# Patient Record
Sex: MALE | Race: WHITE | NOT HISPANIC OR LATINO | URBAN - METROPOLITAN AREA
[De-identification: names, ages, dates, MRNs, and addresses within clinical notes are randomized per-mention and may not be internally consistent; named-entity substitution may affect disease eponyms.]

---

## 2018-06-12 ENCOUNTER — APPOINTMENT (RX ONLY)
Dept: URBAN - METROPOLITAN AREA OTHER 4 | Facility: OTHER | Age: 48
Setting detail: DERMATOLOGY
End: 2018-06-12

## 2018-06-12 DIAGNOSIS — D22 MELANOCYTIC NEVI: ICD-10-CM

## 2018-06-12 DIAGNOSIS — L81.4 OTHER MELANIN HYPERPIGMENTATION: ICD-10-CM

## 2018-06-12 PROBLEM — D22.5 MELANOCYTIC NEVI OF TRUNK: Status: ACTIVE | Noted: 2018-06-12

## 2018-06-12 PROBLEM — L57.0 ACTINIC KERATOSIS: Status: ACTIVE | Noted: 2018-06-12

## 2018-06-12 PROBLEM — D23.71 OTHER BENIGN NEOPLASM OF SKIN OF RIGHT LOWER LIMB, INCLUDING HIP: Status: ACTIVE | Noted: 2018-06-12

## 2018-06-12 PROCEDURE — ? OBSERVATION

## 2018-06-12 PROCEDURE — ? COUNSELING

## 2018-06-12 PROCEDURE — 99214 OFFICE O/P EST MOD 30 MIN: CPT

## 2018-06-12 ASSESSMENT — LOCATION SIMPLE DESCRIPTION DERM
LOCATION SIMPLE: RIGHT UPPER BACK
LOCATION SIMPLE: CHEST

## 2018-06-12 ASSESSMENT — LOCATION DETAILED DESCRIPTION DERM
LOCATION DETAILED: LEFT LATERAL SUPERIOR CHEST
LOCATION DETAILED: RIGHT SUPERIOR LATERAL UPPER BACK

## 2018-06-12 ASSESSMENT — LOCATION ZONE DERM: LOCATION ZONE: TRUNK

## 2018-06-12 NOTE — HPI: SKIN LESION
How Severe Is Your Skin Lesion?: moderate
Has Your Skin Lesion Been Treated?: not been treated
Is This A New Presentation, Or A Follow-Up?: Skin Lesion
Which Family Member (Optional)?: Father and grandfather

## 2019-06-17 ENCOUNTER — APPOINTMENT (RX ONLY)
Dept: URBAN - METROPOLITAN AREA OTHER 4 | Facility: OTHER | Age: 49
Setting detail: DERMATOLOGY
End: 2019-06-17

## 2019-06-17 DIAGNOSIS — D18.0 HEMANGIOMA: ICD-10-CM

## 2019-06-17 DIAGNOSIS — L81.4 OTHER MELANIN HYPERPIGMENTATION: ICD-10-CM

## 2019-06-17 DIAGNOSIS — L82.1 OTHER SEBORRHEIC KERATOSIS: ICD-10-CM

## 2019-06-17 DIAGNOSIS — D22 MELANOCYTIC NEVI: ICD-10-CM

## 2019-06-17 PROBLEM — D23.71 OTHER BENIGN NEOPLASM OF SKIN OF RIGHT LOWER LIMB, INCLUDING HIP: Status: ACTIVE | Noted: 2019-06-17

## 2019-06-17 PROBLEM — D18.01 HEMANGIOMA OF SKIN AND SUBCUTANEOUS TISSUE: Status: ACTIVE | Noted: 2019-06-17

## 2019-06-17 PROBLEM — D22.5 MELANOCYTIC NEVI OF TRUNK: Status: ACTIVE | Noted: 2019-06-17

## 2019-06-17 PROCEDURE — ? LIQUID NITROGEN (COSMETIC)

## 2019-06-17 PROCEDURE — ? COUNSELING

## 2019-06-17 PROCEDURE — 99214 OFFICE O/P EST MOD 30 MIN: CPT

## 2019-06-17 PROCEDURE — ? OTHER

## 2019-06-17 ASSESSMENT — LOCATION DETAILED DESCRIPTION DERM
LOCATION DETAILED: RIGHT SUPERIOR LATERAL UPPER BACK
LOCATION DETAILED: RIGHT INFERIOR UPPER BACK
LOCATION DETAILED: LEFT MEDIAL SUPERIOR CHEST
LOCATION DETAILED: LEFT SUPERIOR MEDIAL MIDBACK
LOCATION DETAILED: LEFT LATERAL UPPER BACK
LOCATION DETAILED: RIGHT SUPERIOR MEDIAL FOREHEAD

## 2019-06-17 ASSESSMENT — LOCATION ZONE DERM
LOCATION ZONE: TRUNK
LOCATION ZONE: FACE

## 2019-06-17 ASSESSMENT — LOCATION SIMPLE DESCRIPTION DERM
LOCATION SIMPLE: LEFT UPPER BACK
LOCATION SIMPLE: LEFT LOWER BACK
LOCATION SIMPLE: RIGHT FOREHEAD
LOCATION SIMPLE: CHEST
LOCATION SIMPLE: RIGHT UPPER BACK

## 2019-06-17 NOTE — PROCEDURE: OTHER
Detail Level: Simple
Other (Free Text): Plan biopsy if not resolved with LN2
Note Text (......Xxx Chief Complaint.): This diagnosis correlates with the

## 2019-06-17 NOTE — PROCEDURE: LIQUID NITROGEN (COSMETIC)
Render Post-Care Instructions In Note?: no
Post-Care Instructions: I reviewed with the patient in detail post-care instructions. Patient is to wear sunprotection, and avoid picking at any of the treated lesions. Pt may apply Vaseline to crusted or scabbing areas.
Detail Level: Simple
Consent: The patient's consent was obtained including but not limited to risks of crusting, scabbing, blistering, scarring, darker or lighter pigmentary change, recurrence, incomplete removal and infection. The patient understands that the procedure is cosmetic in nature and is not covered by insurance.
Price (Use Numbers Only, No Special Characters Or $): 0

## 2020-07-28 ENCOUNTER — APPOINTMENT (RX ONLY)
Dept: URBAN - METROPOLITAN AREA OTHER 5 | Facility: OTHER | Age: 50
Setting detail: DERMATOLOGY
End: 2020-07-28

## 2020-07-28 DIAGNOSIS — L81.4 OTHER MELANIN HYPERPIGMENTATION: ICD-10-CM

## 2020-07-28 DIAGNOSIS — L21.8 OTHER SEBORRHEIC DERMATITIS: ICD-10-CM

## 2020-07-28 DIAGNOSIS — D22 MELANOCYTIC NEVI: ICD-10-CM

## 2020-07-28 PROBLEM — D23.71 OTHER BENIGN NEOPLASM OF SKIN OF RIGHT LOWER LIMB, INCLUDING HIP: Status: ACTIVE | Noted: 2020-07-28

## 2020-07-28 PROBLEM — D22.5 MELANOCYTIC NEVI OF TRUNK: Status: ACTIVE | Noted: 2020-07-28

## 2020-07-28 PROCEDURE — ? PRESCRIPTION

## 2020-07-28 PROCEDURE — ? COUNSELING

## 2020-07-28 PROCEDURE — ? TREATMENT REGIMEN

## 2020-07-28 PROCEDURE — 99214 OFFICE O/P EST MOD 30 MIN: CPT

## 2020-07-28 RX ORDER — HYDROCORTISONE 25 MG/G
1 CREAM TOPICAL BID
Qty: 1 | Refills: 3 | Status: ERX | COMMUNITY
Start: 2020-07-28

## 2020-07-28 RX ORDER — KETOCONAZOLE 20 MG/ML
1 SHAMPOO, SUSPENSION TOPICAL
Qty: 1 | Refills: 3 | Status: ERX | COMMUNITY
Start: 2020-07-28

## 2020-07-28 RX ORDER — KETOCONAZOLE 20 MG/G
1 CREAM TOPICAL BID
Qty: 1 | Refills: 3 | Status: ERX | COMMUNITY
Start: 2020-07-28

## 2020-07-28 RX ADMIN — KETOCONAZOLE 1: 20 SHAMPOO, SUSPENSION TOPICAL at 00:00

## 2020-07-28 RX ADMIN — KETOCONAZOLE 1: 20 CREAM TOPICAL at 00:00

## 2020-07-28 RX ADMIN — HYDROCORTISONE 1: 25 CREAM TOPICAL at 00:00

## 2020-07-28 ASSESSMENT — LOCATION SIMPLE DESCRIPTION DERM
LOCATION SIMPLE: LEFT UPPER BACK
LOCATION SIMPLE: INFERIOR FOREHEAD
LOCATION SIMPLE: UPPER LIP
LOCATION SIMPLE: ABDOMEN

## 2020-07-28 ASSESSMENT — LOCATION DETAILED DESCRIPTION DERM
LOCATION DETAILED: LEFT INFERIOR MEDIAL UPPER BACK
LOCATION DETAILED: PHILTRUM
LOCATION DETAILED: EPIGASTRIC SKIN
LOCATION DETAILED: LEFT SUPERIOR UPPER BACK
LOCATION DETAILED: INFERIOR MID FOREHEAD

## 2020-07-28 ASSESSMENT — LOCATION ZONE DERM
LOCATION ZONE: LIP
LOCATION ZONE: FACE
LOCATION ZONE: TRUNK

## 2021-07-30 ENCOUNTER — APPOINTMENT (RX ONLY)
Dept: URBAN - METROPOLITAN AREA OTHER 4 | Facility: OTHER | Age: 51
Setting detail: DERMATOLOGY
End: 2021-07-30

## 2021-07-30 DIAGNOSIS — L57.8 OTHER SKIN CHANGES DUE TO CHRONIC EXPOSURE TO NONIONIZING RADIATION: ICD-10-CM

## 2021-07-30 DIAGNOSIS — L21.8 OTHER SEBORRHEIC DERMATITIS: ICD-10-CM | Status: INADEQUATELY CONTROLLED

## 2021-07-30 DIAGNOSIS — Z80.8 FAMILY HISTORY OF MALIGNANT NEOPLASM OF OTHER ORGANS OR SYSTEMS: ICD-10-CM

## 2021-07-30 DIAGNOSIS — D22 MELANOCYTIC NEVI: ICD-10-CM

## 2021-07-30 DIAGNOSIS — Z71.89 OTHER SPECIFIED COUNSELING: ICD-10-CM

## 2021-07-30 PROBLEM — D23.71 OTHER BENIGN NEOPLASM OF SKIN OF RIGHT LOWER LIMB, INCLUDING HIP: Status: ACTIVE | Noted: 2021-07-30

## 2021-07-30 PROBLEM — D22.5 MELANOCYTIC NEVI OF TRUNK: Status: ACTIVE | Noted: 2021-07-30

## 2021-07-30 PROCEDURE — 99213 OFFICE O/P EST LOW 20 MIN: CPT

## 2021-07-30 PROCEDURE — ? PRESCRIPTION

## 2021-07-30 PROCEDURE — ? COUNSELING

## 2021-07-30 PROCEDURE — ? OBSERVATION

## 2021-07-30 RX ORDER — KETOCONAZOLE 20 MG/ML
SHAMPOO, SUSPENSION TOPICAL BIW
Qty: 1 | Refills: 3 | Status: ERX | COMMUNITY
Start: 2021-07-30

## 2021-07-30 RX ORDER — HYDROCORTISONE 25 MG/ML
LOTION TOPICAL BID
Qty: 1 | Refills: 2 | Status: ERX | COMMUNITY
Start: 2021-07-30

## 2021-07-30 RX ORDER — KETOCONAZOLE 20 MG/G
CREAM TOPICAL QD
Qty: 1 | Refills: 2 | Status: ERX | COMMUNITY
Start: 2021-07-30

## 2021-07-30 RX ADMIN — KETOCONAZOLE: 20 CREAM TOPICAL at 00:00

## 2021-07-30 RX ADMIN — KETOCONAZOLE: 20 SHAMPOO, SUSPENSION TOPICAL at 00:00

## 2021-07-30 RX ADMIN — HYDROCORTISONE: 25 LOTION TOPICAL at 00:00

## 2021-07-30 ASSESSMENT — LOCATION DETAILED DESCRIPTION DERM
LOCATION DETAILED: LEFT SUPERIOR MEDIAL BUCCAL CHEEK
LOCATION DETAILED: RIGHT LOWER CUTANEOUS LIP
LOCATION DETAILED: INFERIOR THORACIC SPINE
LOCATION DETAILED: RIGHT CHIN
LOCATION DETAILED: LEFT LATERAL INFERIOR CHEST
LOCATION DETAILED: RIGHT MEDIAL SUPERIOR CHEST
LOCATION DETAILED: POSTERIOR MID-PARIETAL SCALP

## 2021-07-30 ASSESSMENT — LOCATION ZONE DERM
LOCATION ZONE: FACE
LOCATION ZONE: LIP
LOCATION ZONE: SCALP
LOCATION ZONE: TRUNK

## 2021-07-30 ASSESSMENT — LOCATION SIMPLE DESCRIPTION DERM
LOCATION SIMPLE: UPPER BACK
LOCATION SIMPLE: LEFT CHEEK
LOCATION SIMPLE: RIGHT LIP
LOCATION SIMPLE: CHIN
LOCATION SIMPLE: POSTERIOR SCALP
LOCATION SIMPLE: CHEST

## 2021-07-30 ASSESSMENT — SEVERITY ASSESSMENT: HOW SEVERE IS THIS PATIENT'S CONDITION?: MILD

## 2022-09-17 ENCOUNTER — OFFICE VISIT (OUTPATIENT)
Dept: URBAN - METROPOLITAN AREA TELEHEALTH 2 | Facility: TELEHEALTH | Age: 52
End: 2022-09-17
Payer: COMMERCIAL

## 2022-09-17 VITALS — HEIGHT: 69 IN | WEIGHT: 205 LBS | BODY MASS INDEX: 30.36 KG/M2

## 2022-09-17 DIAGNOSIS — Z12.11 COLON CANCER SCREENING: ICD-10-CM

## 2022-09-17 DIAGNOSIS — R11.0 NAUSEA: ICD-10-CM

## 2022-09-17 DIAGNOSIS — Z56.6 STRESS AT WORK: ICD-10-CM

## 2022-09-17 DIAGNOSIS — R15.2 DEFECATION URGENCY: ICD-10-CM

## 2022-09-17 DIAGNOSIS — R19.7 WATERY DIARRHEA: ICD-10-CM

## 2022-09-17 PROCEDURE — 99244 OFF/OP CNSLTJ NEW/EST MOD 40: CPT | Performed by: INTERNAL MEDICINE

## 2022-09-17 PROCEDURE — 99204 OFFICE O/P NEW MOD 45 MIN: CPT | Performed by: INTERNAL MEDICINE

## 2022-09-17 RX ORDER — NORTRIPTYLINE HYDROCHLORIDE 10 MG/1
1 CAPSULE CAPSULE ORAL
Qty: 30 | Refills: 2 | OUTPATIENT

## 2022-09-17 RX ORDER — PANTOPRAZOLE SODIUM 40 MG/1
1 TABLET TABLET, DELAYED RELEASE ORAL ONCE A DAY
Qty: 30 | Refills: 2 | OUTPATIENT

## 2022-09-17 RX ORDER — SODIUM, POTASSIUM,MAG SULFATES 17.5-3.13G
177 ML SOLUTION, RECONSTITUTED, ORAL ORAL AS DIRECTED
Qty: 1 BOX | Refills: 0 | OUTPATIENT
Start: 2022-09-17 | End: 2022-09-18

## 2022-09-17 SDOH — HEALTH STABILITY - MENTAL HEALTH: OTHER PHYSICAL AND MENTAL STRAIN RELATED TO WORK: Z56.6

## 2022-09-17 NOTE — HPI-TODAY'S VISIT:
The patient was referred by Dr. Leticia Montero for bowel issues.   A copy of this document is being forwarded to the referring provider.  52 y.o. 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

## 2022-09-22 PROBLEM — 71820002: Status: ACTIVE | Noted: 2022-09-22

## 2022-09-22 PROBLEM — 305058001: Status: ACTIVE | Noted: 2022-09-22

## 2022-10-20 ENCOUNTER — TELEPHONE ENCOUNTER (OUTPATIENT)
Dept: URBAN - METROPOLITAN AREA CLINIC 23 | Facility: CLINIC | Age: 52
End: 2022-10-20

## 2022-10-20 RX ORDER — SODIUM, POTASSIUM,MAG SULFATES 17.5-3.13G
177 ML SOLUTION, RECONSTITUTED, ORAL ORAL AS DIRECTED
Qty: 1 BOX | Refills: 0 | OUTPATIENT
Start: 2022-10-20 | End: 2022-10-21

## 2022-10-20 RX ORDER — NORTRIPTYLINE HYDROCHLORIDE 10 MG/1
1 CAPSULE CAPSULE ORAL
Qty: 30 | Refills: 2 | Status: ACTIVE | COMMUNITY

## 2022-10-20 RX ORDER — PANTOPRAZOLE SODIUM 40 MG/1
1 TABLET TABLET, DELAYED RELEASE ORAL ONCE A DAY
Qty: 30 | Refills: 2 | Status: ACTIVE | COMMUNITY

## 2022-10-24 ENCOUNTER — CLAIMS CREATED FROM THE CLAIM WINDOW (OUTPATIENT)
Dept: URBAN - METROPOLITAN AREA CLINIC 4 | Facility: CLINIC | Age: 52
End: 2022-10-24
Payer: COMMERCIAL

## 2022-10-24 ENCOUNTER — WEB ENCOUNTER (OUTPATIENT)
Dept: URBAN - METROPOLITAN AREA CLINIC 92 | Facility: CLINIC | Age: 52
End: 2022-10-24

## 2022-10-24 ENCOUNTER — OFFICE VISIT (OUTPATIENT)
Dept: URBAN - METROPOLITAN AREA SURGERY CENTER 18 | Facility: SURGERY CENTER | Age: 52
End: 2022-10-24
Payer: COMMERCIAL

## 2022-10-24 DIAGNOSIS — K31.89 ACQUIRED DEFORMITY OF DUODENUM: ICD-10-CM

## 2022-10-24 DIAGNOSIS — D12.3 ADENOMA OF TRANSVERSE COLON: ICD-10-CM

## 2022-10-24 DIAGNOSIS — D12.0 ADENOMA OF CECUM: ICD-10-CM

## 2022-10-24 DIAGNOSIS — R19.7 ACUTE DIARRHEA: ICD-10-CM

## 2022-10-24 DIAGNOSIS — K29.70 GASTRITIS, UNSPECIFIED, WITHOUT BLEEDING: ICD-10-CM

## 2022-10-24 DIAGNOSIS — D12.4 ADENOMA OF DESCENDING COLON: ICD-10-CM

## 2022-10-24 DIAGNOSIS — K21.9 ACID REFLUX: ICD-10-CM

## 2022-10-24 DIAGNOSIS — K31.89 OTHER DISEASES OF STOMACH AND DUODENUM: ICD-10-CM

## 2022-10-24 PROCEDURE — 45380 COLONOSCOPY AND BIOPSY: CPT | Performed by: INTERNAL MEDICINE

## 2022-10-24 PROCEDURE — 88305 TISSUE EXAM BY PATHOLOGIST: CPT | Performed by: PATHOLOGY

## 2022-10-24 PROCEDURE — 45385 COLONOSCOPY W/LESION REMOVAL: CPT | Performed by: INTERNAL MEDICINE

## 2022-10-24 PROCEDURE — 43239 EGD BIOPSY SINGLE/MULTIPLE: CPT | Performed by: INTERNAL MEDICINE

## 2022-10-24 PROCEDURE — 88312 SPECIAL STAINS GROUP 1: CPT | Performed by: PATHOLOGY

## 2022-10-24 PROCEDURE — G8907 PT DOC NO EVENTS ON DISCHARG: HCPCS | Performed by: INTERNAL MEDICINE

## 2022-10-24 RX ORDER — PANTOPRAZOLE SODIUM 40 MG/1
1 TABLET TABLET, DELAYED RELEASE ORAL TWICE A DAY
Qty: 180 TABLET | Refills: 0 | OUTPATIENT

## 2022-10-24 RX ORDER — PANTOPRAZOLE SODIUM 40 MG/1
1 TABLET TABLET, DELAYED RELEASE ORAL ONCE A DAY
Qty: 30 | Refills: 2 | Status: ACTIVE | COMMUNITY

## 2022-10-24 RX ORDER — NORTRIPTYLINE HYDROCHLORIDE 10 MG/1
1 CAPSULE CAPSULE ORAL
Qty: 30 | Refills: 2 | Status: ACTIVE | COMMUNITY

## 2022-11-17 ENCOUNTER — CLAIMS CREATED FROM THE CLAIM WINDOW (OUTPATIENT)
Dept: URBAN - METROPOLITAN AREA TELEHEALTH 2 | Facility: TELEHEALTH | Age: 52
End: 2022-11-17
Payer: COMMERCIAL

## 2022-11-17 VITALS — HEIGHT: 69 IN

## 2022-11-17 DIAGNOSIS — K22.10 EROSIVE ESOPHAGITIS: ICD-10-CM

## 2022-11-17 DIAGNOSIS — R19.7 WATERY DIARRHEA: ICD-10-CM

## 2022-11-17 DIAGNOSIS — R11.0 NAUSEA: ICD-10-CM

## 2022-11-17 PROBLEM — 40719004: Status: ACTIVE | Noted: 2022-11-17

## 2022-11-17 PROBLEM — 422587007: Status: ACTIVE | Noted: 2022-09-22

## 2022-11-17 PROBLEM — 62315008: Status: ACTIVE | Noted: 2022-09-22

## 2022-11-17 PROCEDURE — 99213 OFFICE O/P EST LOW 20 MIN: CPT | Performed by: INTERNAL MEDICINE

## 2022-11-17 RX ORDER — PANTOPRAZOLE SODIUM 40 MG/1
1 TABLET TABLET, DELAYED RELEASE ORAL TWICE A DAY
Qty: 180 TABLET | Refills: 0 | Status: ACTIVE | COMMUNITY

## 2022-11-17 RX ORDER — NORTRIPTYLINE HYDROCHLORIDE 10 MG/1
1 CAPSULE CAPSULE ORAL
Qty: 30 | Refills: 2 | Status: ACTIVE | COMMUNITY

## 2022-11-17 NOTE — HPI-TODAY'S VISIT:
Patient is a 52-year-old male who presents to follow-up from recent upper endoscopy and colonoscopy completed on 10/24/2022.   Findings of EGD  included a small hiatal hernia.  Moderately severe erosive esophagitis with no bleeding.  Bilious gastric fluid with no gross lesions in the entire stomach.  Normal-appearing duodenum.  After procedure advised to use Protonix 40 mg twice a day.   Colonoscopy revealed 1, 6 mm polyp in the cecum.  3, 8 to 16 mm polyps in the transverse colon.  2, 11 to 13 mm polyps in the descending colon.   Pathology report revealed no significant abnormality of the duodenum.  Reactive gastropathy in the stomach.  Reflux type changes in the distal esophagus with no Nuno's esophagus or eosinophilic esophagitis.   Tubular adenomas found in the colon.  Was recommended repeat colonoscopy in 2 years. Currently on Pantoprazole 40mg BID. He states this has significantly improved symptoms  Denies vomiting episodes Notices that smells can trigger nausea but does feel it has improoved since PPI dosing  He has cut out lemon, caffeine, and spicy foods  He knows MSG is a trigger to change in bowel habits  This is also helping in stools- Currently stools are more formed Deneis fever or chills Denies unintetnional weight loss

## 2022-12-08 ENCOUNTER — APPOINTMENT (RX ONLY)
Dept: URBAN - METROPOLITAN AREA OTHER 4 | Facility: OTHER | Age: 52
Setting detail: DERMATOLOGY
End: 2022-12-08

## 2022-12-08 DIAGNOSIS — L24 IRRITANT CONTACT DERMATITIS: ICD-10-CM

## 2022-12-08 DIAGNOSIS — L57.8 OTHER SKIN CHANGES DUE TO CHRONIC EXPOSURE TO NONIONIZING RADIATION: ICD-10-CM

## 2022-12-08 DIAGNOSIS — L21.8 OTHER SEBORRHEIC DERMATITIS: ICD-10-CM | Status: INADEQUATELY CONTROLLED

## 2022-12-08 DIAGNOSIS — D22 MELANOCYTIC NEVI: ICD-10-CM

## 2022-12-08 PROBLEM — D22.5 MELANOCYTIC NEVI OF TRUNK: Status: ACTIVE | Noted: 2022-12-08

## 2022-12-08 PROBLEM — L24.9 IRRITANT CONTACT DERMATITIS, UNSPECIFIED CAUSE: Status: ACTIVE | Noted: 2022-12-08

## 2022-12-08 PROBLEM — L30.9 DERMATITIS, UNSPECIFIED: Status: ACTIVE | Noted: 2022-12-08

## 2022-12-08 PROCEDURE — ? COUNSELING

## 2022-12-08 PROCEDURE — ? TREATMENT REGIMEN

## 2022-12-08 PROCEDURE — ? PRESCRIPTION MEDICATION MANAGEMENT

## 2022-12-08 PROCEDURE — ? OTHER

## 2022-12-08 PROCEDURE — ? PRESCRIPTION

## 2022-12-08 PROCEDURE — ? OBSERVATION

## 2022-12-08 PROCEDURE — 99213 OFFICE O/P EST LOW 20 MIN: CPT

## 2022-12-08 RX ORDER — KETOCONAZOLE 20 MG/G
CREAM TOPICAL BID
Qty: 30 | Refills: 3 | Status: ERX

## 2022-12-08 RX ORDER — KETOCONAZOLE 20 MG/ML
SHAMPOO, SUSPENSION TOPICAL BID
Qty: 120 | Refills: 5 | Status: ERX

## 2022-12-08 RX ORDER — CICLOPIROX 7.7 MG/G
GEL TOPICAL
Qty: 30 | Refills: 3 | Status: ERX | COMMUNITY
Start: 2022-12-08

## 2022-12-08 RX ORDER — HYDROCORTISONE 25 MG/ML
LOTION TOPICAL
Qty: 59 | Refills: 2 | Status: ERX

## 2022-12-08 RX ORDER — TRIAMCINOLONE ACETONIDE 1 MG/G
CREAM TOPICAL BID
Qty: 453.6 | Refills: 2 | Status: ERX | COMMUNITY
Start: 2022-12-08

## 2022-12-08 RX ADMIN — TRIAMCINOLONE ACETONIDE: 1 CREAM TOPICAL at 00:00

## 2022-12-08 RX ADMIN — CICLOPIROX: 7.7 GEL TOPICAL at 00:00

## 2022-12-08 ASSESSMENT — PAIN INTENSITY VAS: HOW INTENSE IS YOUR PAIN 0 BEING NO PAIN, 10 BEING THE MOST SEVERE PAIN POSSIBLE?: NO PAIN

## 2022-12-08 ASSESSMENT — LOCATION SIMPLE DESCRIPTION DERM
LOCATION SIMPLE: LEFT CHEEK
LOCATION SIMPLE: RIGHT THIGH
LOCATION SIMPLE: CHEST
LOCATION SIMPLE: RIGHT LIP
LOCATION SIMPLE: CHIN
LOCATION SIMPLE: POSTERIOR SCALP
LOCATION SIMPLE: LEFT THIGH
LOCATION SIMPLE: LEFT UPPER BACK

## 2022-12-08 ASSESSMENT — LOCATION DETAILED DESCRIPTION DERM
LOCATION DETAILED: LEFT MEDIAL UPPER BACK
LOCATION DETAILED: RIGHT ANTERIOR PROXIMAL THIGH
LOCATION DETAILED: RIGHT CHIN
LOCATION DETAILED: LEFT ANTERIOR PROXIMAL THIGH
LOCATION DETAILED: POSTERIOR MID-PARIETAL SCALP
LOCATION DETAILED: LEFT LATERAL INFERIOR CHEST
LOCATION DETAILED: LEFT SUPERIOR MEDIAL BUCCAL CHEEK
LOCATION DETAILED: RIGHT UPPER CUTANEOUS LIP

## 2022-12-08 ASSESSMENT — LOCATION ZONE DERM
LOCATION ZONE: FACE
LOCATION ZONE: TRUNK
LOCATION ZONE: LEG
LOCATION ZONE: SCALP
LOCATION ZONE: LIP

## 2022-12-08 ASSESSMENT — SEVERITY ASSESSMENT 2020: SEVERITY 2020: MILD

## 2022-12-08 ASSESSMENT — SEVERITY ASSESSMENT: HOW SEVERE IS THIS PATIENT'S CONDITION?: MODERATE

## 2022-12-08 NOTE — PROCEDURE: PRESCRIPTION MEDICATION MANAGEMENT
Detail Level: Simple
Render In Strict Bullet Format?: No
Plan: If QD Ketoconazole not effective,  can replace with ciclopirox
Initiate Treatment: Scalp: \\nKetoconazole shampoo \\nFace: \\nKeroconazole cr QD \\nHydrocortisone lot prn flare \\nCiclopirox gel QD

## 2023-01-11 ENCOUNTER — TELEPHONE ENCOUNTER (OUTPATIENT)
Dept: URBAN - METROPOLITAN AREA CLINIC 96 | Facility: CLINIC | Age: 53
End: 2023-01-11

## 2023-01-11 RX ORDER — PANTOPRAZOLE SODIUM 40 MG/1
1 TABLET TABLET, DELAYED RELEASE ORAL TWICE A DAY
Qty: 180 TABLET | Refills: 2

## 2023-01-23 ENCOUNTER — OFFICE VISIT (OUTPATIENT)
Dept: URBAN - METROPOLITAN AREA CLINIC 96 | Facility: CLINIC | Age: 53
End: 2023-01-23

## 2023-03-08 ENCOUNTER — OFFICE VISIT (OUTPATIENT)
Dept: URBAN - METROPOLITAN AREA TELEHEALTH 2 | Facility: TELEHEALTH | Age: 53
End: 2023-03-08

## 2023-03-08 RX ORDER — NORTRIPTYLINE HYDROCHLORIDE 10 MG/1
1 CAPSULE CAPSULE ORAL
Qty: 30 | Refills: 2 | Status: ACTIVE | COMMUNITY

## 2023-03-08 RX ORDER — PANTOPRAZOLE SODIUM 40 MG/1
1 TABLET TABLET, DELAYED RELEASE ORAL TWICE A DAY
Qty: 180 TABLET | Refills: 2 | Status: ACTIVE | COMMUNITY

## 2023-03-30 ENCOUNTER — OFFICE VISIT (OUTPATIENT)
Dept: URBAN - METROPOLITAN AREA TELEHEALTH 2 | Facility: TELEHEALTH | Age: 53
End: 2023-03-30
Payer: COMMERCIAL

## 2023-03-30 ENCOUNTER — DASHBOARD ENCOUNTERS (OUTPATIENT)
Age: 53
End: 2023-03-30

## 2023-03-30 DIAGNOSIS — K21.00 GASTROESOPHAGEAL REFLUX DISEASE WITH ESOPHAGITIS WITHOUT HEMORRHAGE: ICD-10-CM

## 2023-03-30 DIAGNOSIS — R11.0 NAUSEA: ICD-10-CM

## 2023-03-30 DIAGNOSIS — R63.4 WEIGHT LOSS: ICD-10-CM

## 2023-03-30 DIAGNOSIS — R15.2 FECAL URGENCY: ICD-10-CM

## 2023-03-30 PROBLEM — 266433003: Status: ACTIVE | Noted: 2023-03-30

## 2023-03-30 PROCEDURE — 99213 OFFICE O/P EST LOW 20 MIN: CPT | Performed by: INTERNAL MEDICINE

## 2023-03-30 RX ORDER — PANTOPRAZOLE SODIUM 20 MG/1
1 TABLET TABLET, DELAYED RELEASE ORAL ONCE A DAY
Qty: 90 TABLET | Refills: 1 | OUTPATIENT
Start: 2023-03-30

## 2023-03-30 RX ORDER — RIFAXIMIN 550 MG/1
1 TABLET TABLET ORAL THREE TIMES A DAY
Qty: 42 TABLET | Refills: 0 | OUTPATIENT
Start: 2023-03-30 | End: 2023-04-09

## 2023-03-30 NOTE — HPI-TODAY'S VISIT:
53 y.o. WM Symptoms persistent Want to discuss triggers Nausea and vomiting if cutting raw chicken or pork - triggers it immediately - infrequently, not every time If eats Asian food (Chinese), stomach will immediately want to let it go Was cooking fried rice - put in soy sauce - smell made him throw up Tends to eat very little now - losing weight which is good - very bland Entire set of triggers haven't been ameliorated The other is travels a lot - if goes on long haul flight, will feel quite a bit of discomfort w/ nausea Tried reducing spicy foods, alcohol, dairy, onions - syx still come back When brushes teeth, in throat by matthew mendez, makes him want to gag Urgency / travels diarrhea that bothers him the most

## 2023-04-01 ENCOUNTER — P2P PATIENT RECORD (OUTPATIENT)
Age: 53
End: 2023-04-01

## 2024-05-03 ENCOUNTER — OFFICE VISIT (OUTPATIENT)
Dept: URBAN - METROPOLITAN AREA CLINIC 96 | Facility: CLINIC | Age: 54
End: 2024-05-03

## 2024-05-03 RX ORDER — PANTOPRAZOLE SODIUM 20 MG/1
1 TABLET TABLET, DELAYED RELEASE ORAL ONCE A DAY
Qty: 30 | Refills: 0 | Status: ACTIVE | COMMUNITY
Start: 2023-03-30

## 2024-05-03 RX ORDER — PANTOPRAZOLE SODIUM 20 MG/1
1 TABLET TABLET, DELAYED RELEASE ORAL ONCE A DAY
Qty: 90 TABLET | Refills: 1 | OUTPATIENT

## 2024-05-17 ENCOUNTER — OFFICE VISIT (OUTPATIENT)
Dept: URBAN - METROPOLITAN AREA CLINIC 96 | Facility: CLINIC | Age: 54
End: 2024-05-17

## 2024-06-03 ENCOUNTER — OFFICE VISIT (OUTPATIENT)
Dept: URBAN - METROPOLITAN AREA CLINIC 96 | Facility: CLINIC | Age: 54
End: 2024-06-03
Payer: COMMERCIAL

## 2024-06-03 VITALS
WEIGHT: 200.4 LBS | HEART RATE: 74 BPM | HEIGHT: 69 IN | TEMPERATURE: 97.9 F | DIASTOLIC BLOOD PRESSURE: 71 MMHG | BODY MASS INDEX: 29.68 KG/M2 | SYSTOLIC BLOOD PRESSURE: 107 MMHG

## 2024-06-03 DIAGNOSIS — R15.2 FECAL URGENCY: ICD-10-CM

## 2024-06-03 DIAGNOSIS — R11.0 NAUSEA: ICD-10-CM

## 2024-06-03 DIAGNOSIS — R63.4 WEIGHT LOSS: ICD-10-CM

## 2024-06-03 DIAGNOSIS — K21.00 GASTROESOPHAGEAL REFLUX DISEASE WITH ESOPHAGITIS WITHOUT HEMORRHAGE: ICD-10-CM

## 2024-06-03 PROCEDURE — 99214 OFFICE O/P EST MOD 30 MIN: CPT

## 2024-06-03 RX ORDER — PANTOPRAZOLE SODIUM 20 MG/1
1 TABLET TABLET, DELAYED RELEASE ORAL ONCE A DAY
Qty: 90 TABLET | Refills: 1 | Status: ACTIVE | COMMUNITY

## 2024-06-03 NOTE — HPI-OTHER HISTORIES
Previously 3/2023 with Dr. Martinez: 53 y.o. WM Symptoms persistent Want to discuss triggers Nausea and vomiting if cutting raw chicken or pork - triggers it immediately - infrequently, not every time If eats Asian food (Chinese), stomach will immediately want to let it go Was cooking fried rice - put in soy sauce - smell made him throw up Tends to eat very little now - losing weight which is good - very bland Entire set of triggers haven't been ameliorated The other is travels a lot - if goes on long haul flight, will feel quite a bit of discomfort w/ nausea Tried reducing spicy foods, alcohol, dairy, onions - syx still come back When brushes teeth, in throat by castro apple, makes him want to gag Urgency / travels diarrhea that bothers him the most

## 2024-06-03 NOTE — HPI-TODAY'S VISIT:
54 year old male presents for follow-up. Still same symptoms as before.  Did not have labwork completed, [place]. Violently ill - diarrhea - espcially after dairy. If nausea present while cooking -- especially meats -- diarrhea and vomiting Authenic Chinese immediate diarrhea Don't tend to get it overseas - travels a lot. No PCP in years.   Follows with cardiology - recent work-up.  Gets results tomorrow.

## 2024-07-10 ENCOUNTER — LAB OUTSIDE AN ENCOUNTER (OUTPATIENT)
Dept: URBAN - METROPOLITAN AREA CLINIC 96 | Facility: CLINIC | Age: 54
End: 2024-07-10

## 2024-07-11 ENCOUNTER — LAB OUTSIDE AN ENCOUNTER (OUTPATIENT)
Dept: URBAN - METROPOLITAN AREA CLINIC 96 | Facility: CLINIC | Age: 54
End: 2024-07-11

## 2024-07-11 ENCOUNTER — TELEPHONE ENCOUNTER (OUTPATIENT)
Dept: URBAN - METROPOLITAN AREA CLINIC 96 | Facility: CLINIC | Age: 54
End: 2024-07-11

## 2024-07-11 LAB
ALBUMIN: 4.6
ALKALINE PHOSPHATASE: 152
ALT (SGPT): 129
AST (SGOT): 123
BASO (ABSOLUTE): 0.1
BASOS: 1
BILIRUBIN, TOTAL: 0.5
BUN/CREATININE RATIO: 18
BUN: 24
CALCIUM: 9.9
CARBON DIOXIDE, TOTAL: 14
CHLORIDE: 104
CREATININE: 1.35
EGFR: 63
EOS (ABSOLUTE): 0.2
EOS: 2
GLOBULIN, TOTAL: 2.7
GLUCOSE: 74
HEMATOCRIT: 37.7
HEMATOLOGY COMMENTS:: (no result)
HEMOGLOBIN: 12.5
IMMATURE CELLS: (no result)
IMMATURE GRANS (ABS): 0.5
IMMATURE GRANULOCYTES: 5
LYMPHS (ABSOLUTE): 2.6
LYMPHS: 26
MCH: 36.4
MCHC: 33.2
MCV: 110
MONOCYTES(ABSOLUTE): 0.9
MONOCYTES: 9
NEUTROPHILS (ABSOLUTE): 5.9
NEUTROPHILS: 57
NRBC: (no result)
PLATELETS: 186
POTASSIUM: 4.3
PROTEIN, TOTAL: 7.3
RBC: 3.43
RDW: 14.1
SODIUM: 138
T4,FREE(DIRECT): 1.52
TSH: 3.29
WBC: 10.2

## 2024-07-17 ENCOUNTER — TELEPHONE ENCOUNTER (OUTPATIENT)
Dept: URBAN - METROPOLITAN AREA CLINIC 96 | Facility: CLINIC | Age: 54
End: 2024-07-17

## 2025-06-09 ENCOUNTER — OFFICE VISIT (OUTPATIENT)
Dept: URBAN - METROPOLITAN AREA CLINIC 96 | Facility: CLINIC | Age: 55
End: 2025-06-09

## 2025-06-16 ENCOUNTER — OFFICE VISIT (OUTPATIENT)
Dept: URBAN - METROPOLITAN AREA CLINIC 96 | Facility: CLINIC | Age: 55
End: 2025-06-16

## 2025-06-20 ENCOUNTER — LAB OUTSIDE AN ENCOUNTER (OUTPATIENT)
Dept: URBAN - METROPOLITAN AREA CLINIC 96 | Facility: CLINIC | Age: 55
End: 2025-06-20

## 2025-06-20 ENCOUNTER — OFFICE VISIT (OUTPATIENT)
Dept: URBAN - METROPOLITAN AREA CLINIC 96 | Facility: CLINIC | Age: 55
End: 2025-06-20
Payer: COMMERCIAL

## 2025-06-20 DIAGNOSIS — K76.0 HEPATIC STEATOSIS: ICD-10-CM

## 2025-06-20 DIAGNOSIS — R11.2 NAUSEA AND VOMITING IN ADULT: ICD-10-CM

## 2025-06-20 DIAGNOSIS — Z86.0101 PERSONAL HISTORY OF ADENOMATOUS AND SERRATED COLON POLYPS: ICD-10-CM

## 2025-06-20 DIAGNOSIS — K21.00 GASTROESOPHAGEAL REFLUX DISEASE WITH ESOPHAGITIS WITHOUT HEMORRHAGE: ICD-10-CM

## 2025-06-20 DIAGNOSIS — R15.2 FECAL URGENCY: ICD-10-CM

## 2025-06-20 DIAGNOSIS — D53.9 MACROCYTIC ANEMIA: ICD-10-CM

## 2025-06-20 DIAGNOSIS — R74.8 ABNORMAL LIVER ENZYMES: ICD-10-CM

## 2025-06-20 DIAGNOSIS — R63.4 WEIGHT LOSS: ICD-10-CM

## 2025-06-20 PROBLEM — 83414005: Status: ACTIVE | Noted: 2025-06-20

## 2025-06-20 PROCEDURE — 99215 OFFICE O/P EST HI 40 MIN: CPT

## 2025-06-20 RX ORDER — FOLIC ACID 1 MG/1
TAKE ONE TABLET BY MOUTH ONCE DAILY TABLET ORAL
Qty: 90 EACH | Refills: 1 | Status: ACTIVE | COMMUNITY

## 2025-06-20 RX ORDER — PANTOPRAZOLE SODIUM 20 MG/1
1 TABLET TABLET, DELAYED RELEASE ORAL ONCE A DAY
Qty: 90 TABLET | Refills: 1 | Status: ACTIVE | COMMUNITY

## 2025-06-20 RX ORDER — BUPROPION HYDROCHLORIDE 200 MG/1
TABLET, EXTENDED RELEASE ORAL
Qty: 90 TABLET | Status: ACTIVE | COMMUNITY

## 2025-06-20 RX ORDER — ATORVASTATIN CALCIUM 20 MG/1
1 TAB(S) ORALLY ONCE A DAY 90 DAYS TABLET, FILM COATED ORAL
Qty: 90 EACH | Refills: 1 | Status: ACTIVE | COMMUNITY

## 2025-06-20 RX ORDER — ALLOPURINOL 100 MG/1
TAKE ONE TABLET BY MOUTH TWICE DAILY TABLET ORAL
Qty: 60 EACH | Refills: 1 | Status: ACTIVE | COMMUNITY

## 2025-06-20 NOTE — HPI-TODAY'S VISIT:
Patient was admitted to [facility] 1/23 - 1/20/2025 for acute on chronic nausea, vomiting, and diarrhea, JOE, hypokalemia, hypomagnesemia, alcohol hepatitis, transaminitis, macrocytic anemia, hypertension.  Right upper quadrant ultrasound revealed hepatic steatosis, otherwise unremarkable.  Hepatitis panel was negative.  CT revealed mild wall thickening of rectosigmoid junction which was nonspecific. GI was not consulted during the admission - due to symptom improvement after one day. . Review of primary care note from 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  He has a long history of severe anxiety, currently seeing a psychiatrist.  With the addition of low-dose Wellbutrin he is better.  He is now able to eat without any nausea.  He is currently off his PPI.  While hospitalized he stopped his BP med and allopurinol." . Due to abnormal liver enzymes, he was advised to discontinue his statin.  Primary care labs in February revealed leukocytosis, 15.8, macrocytosis with , and continued elevated hepatic function panel: T. bili 0.8, alk phos 174 (H), AST 62 (H), ALT 65 (H).  TSH, lipid panel, celiac panel unremarkable . Today on 6/20/2025, patient informed me of his hospitalization and efforts to take better care of himself.  He is still drinking alcohol - but has cut back significantly.  His prior issues from last year have significantly improved.  Nausea triggers - such as meats when he is cooking - do not bother him currently. Still with a sensitive gag-reflex -- points to his neck slightly inferior to the mandibular angle.  Often happens when brushing his teeth.   . Labs, 7/10/2024: T. bili 0.5, alk phos 152 (H),  (H),  (H), hemoglobin 12.5 (L),  (H), platelets 186. . Colonoscopy 10/2022 revealed 6 mm sessile polyp in cecum, 3 sessile polyps ranging 8 to 16 mm in size in transverse colon, 2 semipedunculated polyps ranging 11 to 13 mm in size in descending colon.  Polyp biopsies revealed tubular adenomas, while random colonic biopsy without evidence of microscopic colitis.  Repeat 2 years.  EGD 10/2022 revealed small hiatal hernia, moderately severe erosive esophagitis, bilious fluid in gastric body.  Esophageal biopsy with reflux type changes but without evidence of BE or EOE.  Stomach biopsies with chemical/reactive gastropathy, and duodenal biopsies unremarkable.

## 2025-06-20 NOTE — HPI-OTHER HISTORIES
Previously 6/2024 with MEG Benedict: 54 year old male presents for follow-up. Still same symptoms as before.  Did not have labwork completed, since [place] was [place]. Violently ill - diarrhea - espcially after dairy. If nausea present while cooking -- especially meats -- diarrhea and vomiting Authenic Chinese immediate diarrhea Don't tend to get it overseas - travels a lot. No PCP in years.  Follows with cardiology - recent work-up.  Gets results tomorrow.  Previously 3/2023 with Dr. Martinez: 53 y.o. WM Symptoms persistent Want to discuss triggers Nausea and vomiting if cutting raw chicken or pork - triggers it immediately - infrequently, not every time If eats Asian food (Chinese), stomach will immediately want to let it go Was cooking fried rice - put in soy sauce - smell made him throw up Tends to eat very little now - losing weight which is good - very bland Entire set of triggers haven't been ameliorated The other is travels a lot - if goes on long haul flight, will feel quite a bit of discomfort w/ nausea Tried reducing spicy foods, alcohol, dairy, onions - syx still come back When brushes teeth, in throat by matthew mendez, makes him want to gag Urgency / travels diarrhea that bothers him the most

## 2025-06-22 PROBLEM — 197321007: Status: ACTIVE | Noted: 2025-06-22

## 2025-07-08 ENCOUNTER — LAB OUTSIDE AN ENCOUNTER (OUTPATIENT)
Dept: URBAN - METROPOLITAN AREA CLINIC 96 | Facility: CLINIC | Age: 55
End: 2025-07-08

## 2025-07-11 LAB
A/G RATIO: 1.8
ACTIN (SMOOTH MUSCLE) ANTIBODY (IGG): <20
ALBUMIN: 4.3
ALKALINE PHOSPHATASE: 200
ALPHA-1-ANTITRYPSIN QN: 165
ALT (SGPT): 77
AST (SGOT): 97
BILIRUBIN, TOTAL: 1.5
BUN/CREATININE RATIO: (no result)
BUN: 12
CALCIUM: 9.3
CARBON DIOXIDE, TOTAL: 24
CERULOPLASMIN: 24
CHLORIDE: 104
CREATININE: 1.27
EGFR: 68
FERRITIN, SERUM: 404
FOLATE, SERUM: 18.1
GLOBULIN, TOTAL: 2.4
GLUCOSE: 94
HEMATOCRIT: 43.4
HEMOGLOBIN: 14.1
IRON BIND.CAP.(TIBC): 394
IRON SATURATION: 36
IRON: 142
LIPASE: 26
MCH: 34.6
MCHC: 32.5
MCV: 106.4
MITOCHONDRIAL (M2) ANTIBODY: <=20
MPV: 13.4
PLATELET COUNT: 33
POTASSIUM: 3.8
PROTEIN, TOTAL: 6.7
RDW: 13.7
RED BLOOD CELL COUNT: 4.08
SODIUM: 140
VITAMIN B12: 1211
WHITE BLOOD CELL COUNT: 8.4

## 2025-07-15 ENCOUNTER — OFFICE VISIT (OUTPATIENT)
Dept: URBAN - METROPOLITAN AREA SURGERY CENTER 18 | Facility: SURGERY CENTER | Age: 55
End: 2025-07-15

## 2025-07-15 ENCOUNTER — WEB ENCOUNTER (OUTPATIENT)
Dept: URBAN - METROPOLITAN AREA CLINIC 96 | Facility: CLINIC | Age: 55
End: 2025-07-15

## 2025-07-15 PROBLEM — 302215000: Status: ACTIVE | Noted: 2025-07-15

## 2025-07-15 RX ORDER — PANTOPRAZOLE SODIUM 40 MG/1
1 TABLET TABLET, DELAYED RELEASE ORAL TWICE A DAY
Qty: 180 TABLET | Refills: 0 | OUTPATIENT
Start: 2025-07-15

## 2025-07-21 ENCOUNTER — TELEPHONE ENCOUNTER (OUTPATIENT)
Dept: URBAN - METROPOLITAN AREA CLINIC 96 | Facility: CLINIC | Age: 55
End: 2025-07-21

## 2025-07-30 ENCOUNTER — TELEPHONE ENCOUNTER (OUTPATIENT)
Dept: URBAN - METROPOLITAN AREA CLINIC 96 | Facility: CLINIC | Age: 55
End: 2025-07-30

## 2025-08-01 ENCOUNTER — WEB ENCOUNTER (OUTPATIENT)
Dept: URBAN - METROPOLITAN AREA CLINIC 96 | Facility: CLINIC | Age: 55
End: 2025-08-01

## 2025-08-11 ENCOUNTER — WEB ENCOUNTER (OUTPATIENT)
Dept: URBAN - METROPOLITAN AREA CLINIC 98 | Facility: CLINIC | Age: 55
End: 2025-08-11

## 2025-08-14 ENCOUNTER — WEB ENCOUNTER (OUTPATIENT)
Dept: URBAN - METROPOLITAN AREA CLINIC 96 | Facility: CLINIC | Age: 55
End: 2025-08-14

## 2025-08-18 ENCOUNTER — WEB ENCOUNTER (OUTPATIENT)
Dept: URBAN - METROPOLITAN AREA CLINIC 96 | Facility: CLINIC | Age: 55
End: 2025-08-18

## 2025-08-19 ENCOUNTER — OFFICE VISIT (OUTPATIENT)
Dept: URBAN - METROPOLITAN AREA SURGERY CENTER 18 | Facility: SURGERY CENTER | Age: 55
End: 2025-08-19
Payer: COMMERCIAL

## 2025-08-19 ENCOUNTER — CLAIMS CREATED FROM THE CLAIM WINDOW (OUTPATIENT)
Dept: URBAN - METROPOLITAN AREA SURGERY CENTER 18 | Facility: SURGERY CENTER | Age: 55
End: 2025-08-19

## 2025-08-19 ENCOUNTER — WEB ENCOUNTER (OUTPATIENT)
Dept: URBAN - METROPOLITAN AREA CLINIC 96 | Facility: CLINIC | Age: 55
End: 2025-08-19

## 2025-08-19 DIAGNOSIS — D12.0 ADENOMA OF CECUM: ICD-10-CM

## 2025-08-19 DIAGNOSIS — D12.3 ADENOMA OF TRANSVERSE COLON: ICD-10-CM

## 2025-08-19 DIAGNOSIS — D12.5 ADENOMA OF SIGMOID COLON: ICD-10-CM

## 2025-08-19 DIAGNOSIS — Z86.0101 H/O ADENOMATOUS POLYP OF COLON: ICD-10-CM

## 2025-08-19 DIAGNOSIS — R11.2 ACUTE NAUSEA WITH NONBILIOUS VOMITING: ICD-10-CM

## 2025-08-19 DIAGNOSIS — D12.4 ADENOMA OF DESCENDING COLON: ICD-10-CM

## 2025-08-19 DIAGNOSIS — K21.00 ALKALINE REFLUX ESOPHAGITIS: ICD-10-CM

## 2025-08-19 PROCEDURE — 43235 EGD DIAGNOSTIC BRUSH WASH: CPT | Performed by: INTERNAL MEDICINE

## 2025-08-19 PROCEDURE — 45380 COLONOSCOPY AND BIOPSY: CPT | Performed by: INTERNAL MEDICINE

## 2025-08-19 PROCEDURE — 0529F INTRVL 3/>YR PTS CLNSCP DOCD: CPT | Performed by: INTERNAL MEDICINE

## 2025-08-19 PROCEDURE — 45385 COLONOSCOPY W/LESION REMOVAL: CPT | Performed by: INTERNAL MEDICINE

## 2025-08-19 RX ORDER — PANTOPRAZOLE SODIUM 20 MG/1
1 TABLET TABLET, DELAYED RELEASE ORAL ONCE A DAY
Qty: 90 TABLET | Refills: 1 | Status: ACTIVE | COMMUNITY

## 2025-08-19 RX ORDER — ATORVASTATIN CALCIUM 20 MG/1
1 TAB(S) ORALLY ONCE A DAY 90 DAYS TABLET, FILM COATED ORAL
Qty: 90 EACH | Refills: 1 | Status: ACTIVE | COMMUNITY

## 2025-08-19 RX ORDER — FOLIC ACID 1 MG/1
TAKE ONE TABLET BY MOUTH ONCE DAILY TABLET ORAL
Qty: 90 EACH | Refills: 1 | Status: ACTIVE | COMMUNITY

## 2025-08-19 RX ORDER — BUPROPION HYDROCHLORIDE 200 MG/1
TABLET, EXTENDED RELEASE ORAL
Qty: 90 TABLET | Status: ACTIVE | COMMUNITY

## 2025-08-19 RX ORDER — ALLOPURINOL 100 MG/1
TAKE ONE TABLET BY MOUTH TWICE DAILY TABLET ORAL
Qty: 60 EACH | Refills: 1 | Status: ACTIVE | COMMUNITY

## 2025-08-19 RX ORDER — PANTOPRAZOLE SODIUM 40 MG/1
1 TABLET TABLET, DELAYED RELEASE ORAL TWICE A DAY
Qty: 180 TABLET | Refills: 0 | Status: ACTIVE | COMMUNITY
Start: 2025-07-15